# Patient Record
Sex: MALE | URBAN - METROPOLITAN AREA
[De-identification: names, ages, dates, MRNs, and addresses within clinical notes are randomized per-mention and may not be internally consistent; named-entity substitution may affect disease eponyms.]

---

## 2022-09-24 ENCOUNTER — TESTING ONLY (OUTPATIENT)
Dept: URBAN - METROPOLITAN AREA CLINIC 71 | Facility: CLINIC | Age: 87
End: 2022-09-24
Payer: MEDICARE

## 2022-09-24 DIAGNOSIS — L03.213 PRESEPTAL CELLULITIS: ICD-10-CM

## 2022-09-24 DIAGNOSIS — H16.222 KERATOCONJUNCT SICCA, NOT SPECIFIED AS SJOGREN'S, LEFT EYE: Primary | ICD-10-CM

## 2022-09-24 PROCEDURE — 99202 OFFICE O/P NEW SF 15 MIN: CPT

## 2022-09-24 RX ORDER — FLUOROMETHOLONE 1 MG/ML
0.1 % SOLUTION/ DROPS OPHTHALMIC
Qty: 5 | Refills: 0 | Status: ACTIVE
Start: 2022-09-24

## 2022-09-24 ASSESSMENT — INTRAOCULAR PRESSURE
OS: 19
OD: 21

## 2022-09-24 NOTE — IMPRESSION/PLAN
Impression: Preseptal cellulitis: J47.091.
 -- Resolving OS Plan: Instructed patient to continue taking the oral clindamycin per ER for a total of 7 days as directed and to RTC immediately if does not continue to resolve. Patient expressed understanding.

## 2022-09-24 NOTE — IMPRESSION/PLAN
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

 -- 3+ confluent SPK inferior Plan: Explained that the left eye is dry and irritated. Instructed patient to discontinue the ciprofloxacin drops and to start using OTC ATs to help with the dryness. Prescribed FML to be used on the left eye only BID for the next 7 days to help reduce inflammation. Patient expressed understanding.

## 2022-09-26 ENCOUNTER — OFFICE VISIT (OUTPATIENT)
Dept: URBAN - METROPOLITAN AREA CLINIC 71 | Facility: CLINIC | Age: 87
End: 2022-09-26
Payer: MEDICARE

## 2022-09-26 DIAGNOSIS — L03.213 PRESEPTAL CELLULITIS: Primary | ICD-10-CM

## 2022-09-26 DIAGNOSIS — H16.222 KERATOCONJUNCT SICCA, NOT SPECIFIED AS SJOGREN'S, LEFT EYE: ICD-10-CM

## 2022-09-26 PROCEDURE — 99202 OFFICE O/P NEW SF 15 MIN: CPT | Performed by: OPHTHALMOLOGY

## 2022-09-26 ASSESSMENT — INTRAOCULAR PRESSURE
OD: 13
OS: 11

## 2022-09-26 NOTE — IMPRESSION/PLAN
Impression: Preseptal cellulitis: R86.027. No erythema. Appears resolved. Plan: Instructed patient to continue taking the oral clindamycin per ER for a total of 7 days as directed.

## 2022-09-26 NOTE — IMPRESSION/PLAN
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 Plan: Continue FML to be used on the left eye only BID as directed.  
Patient to return in 1 month for a full dilated exam.